# Patient Record
Sex: FEMALE | URBAN - METROPOLITAN AREA
[De-identification: names, ages, dates, MRNs, and addresses within clinical notes are randomized per-mention and may not be internally consistent; named-entity substitution may affect disease eponyms.]

---

## 2020-11-10 ENCOUNTER — IMPORTED ENCOUNTER (OUTPATIENT)
Dept: URBAN - METROPOLITAN AREA CLINIC 31 | Facility: CLINIC | Age: 60
End: 2020-11-10

## 2020-11-10 PROBLEM — H25.13: Noted: 2020-11-10

## 2020-11-10 PROCEDURE — 92015 DETERMINE REFRACTIVE STATE: CPT

## 2020-11-10 PROCEDURE — 92310 CONTACT LENS FITTING OU: CPT

## 2020-11-10 PROCEDURE — 92004 COMPRE OPH EXAM NEW PT 1/>: CPT

## 2020-11-10 PROCEDURE — 99204 OFFICE O/P NEW MOD 45 MIN: CPT

## 2020-11-10 NOTE — PATIENT DISCUSSION
1.  Nuclear Sclerotic Cataract OU: Explained how cataracts can effect vision. Recommend clinical observation. The patient was advised to contact us if any change or worsening of vision. 2. [place]--Wears cls Dist ou--Oasys 14 day torics  change rx dist-   -4.50-2.25x010/-4.75-2.25x170.  -Eval 120. Only wear 14 days not 4 weeks. remove sooner in evening. 3.  Wear gls at night rmove cls sooner to rest eyes.  4.  RTN  1 yr DORIAN Doyle

## 2020-12-10 ENCOUNTER — IMPORTED ENCOUNTER (OUTPATIENT)
Dept: URBAN - METROPOLITAN AREA CLINIC 31 | Facility: CLINIC | Age: 60
End: 2020-12-10

## 2020-12-10 NOTE — PATIENT DISCUSSION
1.  Pt came in with 1st pr from new rx boxes. Va was 20/70 in each eye. Torics looked reversed--Switched cls and visions were 20/20 in each eye--Pt was happy with new rx.

## 2021-02-02 ENCOUNTER — IMPORTED ENCOUNTER (OUTPATIENT)
Dept: URBAN - METROPOLITAN AREA CLINIC 31 | Facility: CLINIC | Age: 61
End: 2021-02-02

## 2021-02-02 NOTE — PATIENT DISCUSSION
1.  Pt says she has tried 2 prs from new boxes and she cannot see. She can see with her old rx yet she states she sees better with the new rx. Tried 3 diff Oasys torics (which is exactly what she had worn prev) and little better with rotation. 2.  Disp new trial in OD of Oasys -4.00-2.25x020 and pt will try. Pt asked about RGP's since she had worn them yrs ago and liked them. Told pt we could not return her boxes of soft cls. Pt will try samples and call. 2/3/21  Pt called and said she wanted to try RGP's  -and return her boxes of soft cls.   AB

## 2021-04-29 ENCOUNTER — OFFICE VISIT (OUTPATIENT)
Dept: URBAN - METROPOLITAN AREA CLINIC 75 | Facility: CLINIC | Age: 61
End: 2021-04-29
Payer: COMMERCIAL

## 2021-04-29 DIAGNOSIS — H25.13 AGE-RELATED NUCLEAR CATARACT, BILATERAL: ICD-10-CM

## 2021-04-29 DIAGNOSIS — H52.4 PRESBYOPIA: Primary | ICD-10-CM

## 2021-04-29 PROCEDURE — 92310 CONTACT LENS FITTING OU: CPT | Performed by: OPTOMETRIST

## 2021-04-29 PROCEDURE — 99203 OFFICE O/P NEW LOW 30 MIN: CPT | Performed by: OPTOMETRIST

## 2021-04-29 ASSESSMENT — KERATOMETRY
OD: 46.00
OS: 46.00

## 2021-04-29 ASSESSMENT — INTRAOCULAR PRESSURE
OD: 12
OS: 12

## 2021-04-29 NOTE — IMPRESSION/PLAN
Impression: Presbyopia: H52.4-Manish ordered today. Result in chart. Discussed scleral lenses for comfort. Plan: Ed pt options for GP's. Pt elects to proceed with scleral lens fit OU for distance. Pt notified about $1750.00 charge $750 fit $1000 lens fee Scleral Lens Initial Fit: *need to increase SAG OU. OD: SAG// 4500, pwr: -2.00, brian: 15.6, edg +75 -75, OR: -1.00 VA: 20/20 Toric Haptic @ 7 & 2, steepen flat haptic by .5 diopters, leave steep haptic same. SAG 4600 MP std L -25um LZ +25/-75
2 dots hydrapeg OS: SAG 4550 -2.00 15.6 one fit med +75/-75 OR: -2.00 VA: 20/20-1 Toric Haptic @ 7 & 2, Steepen steep meridian by .5 diopters. SAG 4700 MP -50 L std LZ+75/-125
1 dot hydrapeg

## 2021-05-18 ENCOUNTER — OFFICE VISIT (OUTPATIENT)
Dept: URBAN - METROPOLITAN AREA CLINIC 75 | Facility: CLINIC | Age: 61
End: 2021-05-18

## 2021-05-18 PROCEDURE — 92310 CONTACT LENS FITTING OU: CPT | Performed by: OPTOMETRIST

## 2021-05-18 NOTE — IMPRESSION/PLAN
Impression: Presbyopia: H52.4-Dispense appointment Plan: Scleral Lens Fit: 
OD: 
OR: [place] Adequate coverage OU.  Dispense lenses to patient after I&R 
OS:
OR: -0.50

** 1 month Scleral Lens R/C

## 2021-06-29 ENCOUNTER — OFFICE VISIT (OUTPATIENT)
Dept: URBAN - METROPOLITAN AREA CLINIC 75 | Facility: CLINIC | Age: 61
End: 2021-06-29

## 2021-06-29 PROCEDURE — 92310 CONTACT LENS FITTING OU: CPT | Performed by: OPTOMETRIST

## 2021-06-29 NOTE — IMPRESSION/PLAN
Impression: Presbyopia: H52.4-Dispense appointment Plan: Scleral Lens Fit: 
OR: -0.50    20/15-2 Adequate coverage OU, No change to fit needed.  
OR: -0.75   20/20-2

## 2021-12-20 ENCOUNTER — OFFICE VISIT (OUTPATIENT)
Dept: URBAN - METROPOLITAN AREA CLINIC 75 | Facility: CLINIC | Age: 61
End: 2021-12-20
Payer: COMMERCIAL

## 2021-12-20 DIAGNOSIS — H43.812 VITREOUS DEGENERATION, LEFT EYE: Primary | ICD-10-CM

## 2021-12-20 PROCEDURE — 99213 OFFICE O/P EST LOW 20 MIN: CPT | Performed by: OPTOMETRIST

## 2021-12-20 ASSESSMENT — INTRAOCULAR PRESSURE: OS: 11

## 2021-12-20 NOTE — IMPRESSION/PLAN
Impression: Vitreous degeneration, left eye: H43.812. New DX 

OPTOS ordered and performed 12/20/21 revealing complete PVD creating floaters. Also known as posterior vitreous detachments are a normal aging change due to the vitreous jelly pulling away from the retinal lining of the eye. They may accompany retinal tears, retinal holes, or retinal detachments, so a dilated retinal examination is important. PVDs will usually diminish with time, but it may take several months and they rarely disappear entirely. Plan: Discussed. Pt to contact office if symptoms worsen, including an increase in number of floaters, you experience flashing lights, loss of vision, or a black curtain blocking your field of vision.

## 2021-12-20 NOTE — IMPRESSION/PLAN
Impression: Age-related nuclear cataract, bilateral: H25.13. Some specific cataracts like posterior subcapsular cataracts occur more commonly in diabetics, patients taking long term steroid medications, and following trauma. The appropriate time to perform cataract surgery is when the loss of vision is interfering with your activities of daily living and a change in eyeglass prescription won't help. Plan: Discussed cataracts do not require treatment unless they interfere with vision and impact one's activities of daily living, in which case cataract extraction with lens implant insertion should be performed. Pt declines any difficulty w/ vision at this time and elects to observe for now. No sx recommended. Discussed signs and symptoms of cataract progression. Pt to contact office if they experience progressive loss of vision, increasing glare, and problems with activities of daily living such as driving, reading, watching TV, seeing street signs, and following the golf ball.

## 2022-04-01 ASSESSMENT — TONOMETRY
OS_IOP_MMHG: 15
OD_IOP_MMHG: 15

## 2022-04-01 ASSESSMENT — VISUAL ACUITY
OS_CC: 20/400
OD_SC: 20/25-2
OD_CC: 20/400
OS_SC: 20/25-2

## 2023-06-08 ENCOUNTER — OFFICE VISIT (OUTPATIENT)
Dept: URBAN - METROPOLITAN AREA CLINIC 75 | Facility: CLINIC | Age: 63
End: 2023-06-08
Payer: COMMERCIAL

## 2023-06-08 DIAGNOSIS — H43.813 VITREOUS DEGENERATION, BILATERAL: Primary | ICD-10-CM

## 2023-06-08 DIAGNOSIS — H25.13 AGE-RELATED NUCLEAR CATARACT, BILATERAL: ICD-10-CM

## 2023-06-08 DIAGNOSIS — H35.373 PUCKERING OF MACULA, BILATERAL: ICD-10-CM

## 2023-06-08 DIAGNOSIS — H44.23 DEGENERATIVE MYOPIA, BILATERAL: ICD-10-CM

## 2023-06-08 PROCEDURE — 99214 OFFICE O/P EST MOD 30 MIN: CPT | Performed by: OPTOMETRIST

## 2023-06-08 ASSESSMENT — INTRAOCULAR PRESSURE
OD: 16
OS: 14